# Patient Record
Sex: MALE | Race: OTHER | HISPANIC OR LATINO | Employment: UNEMPLOYED | URBAN - NONMETROPOLITAN AREA
[De-identification: names, ages, dates, MRNs, and addresses within clinical notes are randomized per-mention and may not be internally consistent; named-entity substitution may affect disease eponyms.]

---

## 2024-07-26 ENCOUNTER — HOSPITAL ENCOUNTER (EMERGENCY)
Facility: HOSPITAL | Age: 34
Discharge: HOME | End: 2024-07-26
Attending: EMERGENCY MEDICINE

## 2024-07-26 VITALS
RESPIRATION RATE: 16 BRPM | HEART RATE: 80 BPM | SYSTOLIC BLOOD PRESSURE: 160 MMHG | OXYGEN SATURATION: 100 % | WEIGHT: 188 LBS | DIASTOLIC BLOOD PRESSURE: 85 MMHG | TEMPERATURE: 98 F

## 2024-07-26 DIAGNOSIS — R07.0 THROAT DISCOMFORT: Primary | ICD-10-CM

## 2024-07-26 DIAGNOSIS — H92.02 EAR PAIN, LEFT: ICD-10-CM

## 2024-07-26 PROCEDURE — 99283 EMERGENCY DEPT VISIT LOW MDM: CPT

## 2024-07-26 RX ORDER — SUCRALFATE 1 G/1
1 TABLET ORAL
Qty: 40 TABLET | Refills: 0 | Status: SHIPPED | OUTPATIENT
Start: 2024-07-26 | End: 2024-08-05

## 2024-07-26 RX ORDER — FAMOTIDINE 20 MG/1
20 TABLET, FILM COATED ORAL 2 TIMES DAILY
Qty: 28 TABLET | Refills: 0 | Status: SHIPPED | OUTPATIENT
Start: 2024-07-26 | End: 2024-08-09

## 2024-07-26 NOTE — Clinical Note
"Bhargav Young" Jovi Lindsay was seen and treated in our emergency department on 7/26/2024.  He may return to work on 07/27/2024.       If you have any questions or concerns, please don't hesitate to call.      Christy Nichole, AMEENA"

## 2024-07-26 NOTE — DISCHARGE INSTRUCTIONS
Take medication as directed. Follow a bland diet while experiencing symptoms of throat and chest discomfort. See your doctor in 1-2 days and return to the ED for worsening symptoms.

## 2024-07-26 NOTE — ED PROVIDER NOTES
Encounter Date: 7/26/2024       History     Chief Complaint   Patient presents with    Sore Throat     Pt stated that for the past couple months he has been experiencing sensation that something is stuck in his throat. Also c/o left otalgia.      This is a 33-year-old  male with no reported past medical history who presents to the emergency department with multiple complaints.  Patient complains of intermittent sensation of pressure in throat and chest experienced after meals associated with burning sensation.  He admits to experiencing occasional nausea as well, however denies vomiting, shortness of breath, diarrhea, or abdominal pain.  Patient is also requesting evaluation of left ear as he has had some mild discomfort over the last 2 days, denies fever, URI signs and symptoms, drainage, or any other relative symptoms at this time.      Review of patient's allergies indicates:  No Known Allergies  History reviewed. No pertinent past medical history.  No past surgical history on file.  No family history on file.     Review of Systems   Constitutional:  Negative for appetite change and fever.   HENT:  Positive for ear pain, sore throat and trouble swallowing. Negative for congestion, ear discharge and rhinorrhea.    Respiratory:  Negative for cough.    Gastrointestinal:  Positive for nausea. Negative for abdominal pain and vomiting.       Physical Exam     Initial Vitals [07/26/24 1702]   BP Pulse Resp Temp SpO2   (!) 160/85 80 16 97.8 °F (36.6 °C) 100 %      MAP       --         Physical Exam    Nursing note and vitals reviewed.  Constitutional: He appears well-developed and well-nourished. He is active. No distress.   HENT:   Head: Normocephalic and atraumatic.   Right Ear: Tympanic membrane and ear canal normal.   Left Ear: Tympanic membrane and ear canal normal.   Mouth/Throat: Oropharynx is clear and moist. No oropharyngeal exudate.   Eyes: EOM are normal. Pupils are equal, round, and reactive to light.    Neck: Neck supple.   Normal range of motion.  Cardiovascular:  Normal rate, regular rhythm and normal heart sounds.           Pulmonary/Chest: Breath sounds normal. No respiratory distress.   Musculoskeletal:         General: Normal range of motion.      Cervical back: Normal range of motion and neck supple.     Neurological: He is alert and oriented to person, place, and time. GCS score is 15. GCS eye subscore is 4. GCS verbal subscore is 5. GCS motor subscore is 6.   Skin: Skin is warm and dry. Capillary refill takes less than 2 seconds.   Psychiatric: He has a normal mood and affect. His behavior is normal. Thought content normal.         ED Course   Procedures  Labs Reviewed - No data to display       Imaging Results    None          Medications - No data to display  Medical Decision Making  Risk  Prescription drug management.                                      Clinical Impression:  Final diagnoses:  [R07.0] Throat discomfort (Primary)  [H92.02] Ear pain, left          ED Disposition Condition    Discharge Stable          ED Prescriptions       Medication Sig Dispense Start Date End Date Auth. Provider    sucralfate (CARAFATE) 1 gram tablet Take 1 tablet (1 g total) by mouth 4 (four) times daily before meals and nightly. for 10 days 40 tablet 7/26/2024 8/5/2024 Christy Nichole NP    famotidine (PEPCID) 20 MG tablet Take 1 tablet (20 mg total) by mouth 2 (two) times daily. for 14 days 28 tablet 7/26/2024 8/9/2024 Christy Nichole NP          Follow-up Information       Follow up With Specialties Details Why Contact Info    PCP Follow UP  Schedule an appointment as soon as possible for a visit in 2 days for follow-up, for re-evaluation of today's complaint              Christy Nichole NP  07/26/24 [id number]